# Patient Record
Sex: FEMALE | Race: WHITE | NOT HISPANIC OR LATINO | Employment: UNEMPLOYED | URBAN - NONMETROPOLITAN AREA
[De-identification: names, ages, dates, MRNs, and addresses within clinical notes are randomized per-mention and may not be internally consistent; named-entity substitution may affect disease eponyms.]

---

## 2018-09-07 ENCOUNTER — PROCEDURE VISIT (OUTPATIENT)
Dept: OBSTETRICS AND GYNECOLOGY | Facility: CLINIC | Age: 23
End: 2018-09-07

## 2018-09-07 VITALS
WEIGHT: 108 LBS | HEIGHT: 62 IN | SYSTOLIC BLOOD PRESSURE: 110 MMHG | DIASTOLIC BLOOD PRESSURE: 62 MMHG | BODY MASS INDEX: 19.88 KG/M2

## 2018-09-07 DIAGNOSIS — Z12.4 SCREENING FOR MALIGNANT NEOPLASM OF CERVIX: ICD-10-CM

## 2018-09-07 DIAGNOSIS — Z01.419 ENCOUNTER FOR WELL WOMAN EXAM WITH ROUTINE GYNECOLOGICAL EXAM: Primary | ICD-10-CM

## 2018-09-07 PROCEDURE — 99385 PREV VISIT NEW AGE 18-39: CPT | Performed by: OBSTETRICS & GYNECOLOGY

## 2018-09-07 NOTE — PROGRESS NOTES
Subjective  Chief Complaint   Patient presents with   • Gynecologic Exam     NO COMPLAINTS. NO PREVIOUS PAP SMEARS, LMP: 8/15/18. REQUESTING REFILL ON SPRINTEC     Usha Delong is a 23 y.o. year old  presenting to be seen for her annual exam.     OTHER COMPLAINTS:  Nothing else    She denies nausea, emesis, fevers, chills, mastalgia, myalgia, dyspnea, chest pain, headaches, vision changes.    MENSTRUAL Hx:  LMP 8/15/18  In the past 6 months her cycles have been regular, predictable and occur monthly.   Her menstrual flow is typically normal and flow is normal.   Each month on average there are roughly 5 days of very heavy flow.    Intermenstrual bleeding is absent.    Post-coital bleeding is absent.  Dysmenorrhea: none and is not affecting her activities of daily living  PMS: none and is not affecting her activities of daily living  Her cycles are not a source of concern for her that she wishes to discuss today.   Current birth control method: OCP (estrogen/progesterone).    Routine Health Maintenance  Last Pap Smear: Never  Last MMG:  Never  Last Dexa:  Never  Last Colonoscopy: Never    History  Past Medical History:   Diagnosis Date   • Scoliosis      Current Outpatient Prescriptions on File Prior to Visit   Medication Sig Dispense Refill   • [DISCONTINUED] norgestimate-ethinyl estradiol (SPRINTEC 28) 0.25-35 MG-MCG per tablet Take 1 tablet by mouth Daily.     • cyclobenzaprine (FLEXERIL) 10 MG tablet Take 1 tablet by mouth 3 (Three) Times a Day As Needed for Muscle Spasms (no driving or operating equipment while taking). 20 tablet 0   • diclofenac (VOLTAREN) 50 MG EC tablet Take 1 tablet by mouth 3 (Three) Times a Day. Take with food 60 tablet 0   • MethylPREDNISolone (MEDROL, AUNG,) 4 MG tablet Take as directed on package instructions. 21 tablet 0     No current facility-administered medications on file prior to visit.      No Known Allergies  History reviewed. No pertinent surgical  "history.  History reviewed. No pertinent family history.  Social History     Social History   • Marital status: Single     Social History Main Topics   • Smoking status: Former Smoker   • Drug use: Unknown     Other Topics Concern   • Not on file       Review of Systems  Pertinent items are noted in HPI, all other systems reviewed and negative    Objective  /62   Ht 157.5 cm (62\")   Wt 49 kg (108 lb)   BMI 19.75 kg/m²   Physical Exam:  General Appearance: alert, pleasant, appears stated age, interactive and cooperative  Head: normocephalic, without obvious abnormality and atraumatic  Eyes: lids and lashes normal and no icterus  Ears: ears appear intact with no abnormalities noted  Nose: nares normal, septum midline, mucosa normal and no drainage  Neck: suppple, trachea midline and no thyromegaly  Back: no kyphosis present, no scoliosis present and range of motion normal  Lungs: respirations regular, respirations even and respirations unlabored  Abdomen: no masses, no hepatomegaly, no splenomegaly, soft non-tender, no guarding and no rebound tenderness  Extremities: moves extremities well, no edema, no cyanosis and no redness  Skin: no bleeding, bruising or rash and no lesions noted  Lymph Nodes: no palpable adenopathy  Neurologic: Cranial Nerves cranial nerves 2 - 12 grossly intact, Speech normal content and execusion, Coordination normal  Psych: normal mood and affect, oriented to person, time and place, thought content organized and appropriate judgment    Pelvis:  Pelvic: Clinical staff was present for exam  External genitalia:  normal appearance of the external genitalia including Bartholin's and [place]'s glands.  :  urethral meatus normal;  Vagina:  normal pink mucosa without prolapse or lesions.  Cervix:  normal appearance.  Uterus:  normal size, shape and consistency.  Adnexa:  normal bimanual exam of the adnexa.  Rectal:  digital rectal exam not performed; anus visually normal " appearing.    Assessment/Plan     Problem List Items Addressed This Visit     None      Visit Diagnoses     Screening for malignant neoplasm of cervix    -  Primary    Relevant Orders    Pap IG, Rfx HPV ASCU        Well woman exam:  - Self breast awareness encouraged  - Mammogram: N/A  - Pap screening guidelines reviewed; pap smear collected today  - Yearly clinical breast and pelvic exams recommended regardless of pap recommendations  - Dexa N/A  - Colonoscopy N/A  - Healthy diet and exercise encouraged  - Calcium and Vitamin D requirements reviewed  - Contraception: OCPs  - Screening: None  - Seat belt use encouraged    Follow up 1 year for annual exam    Brien Gonzalez MD  Obstetrics and Gynecology  [facility]

## 2018-09-10 ENCOUNTER — TELEPHONE (OUTPATIENT)
Dept: OBSTETRICS AND GYNECOLOGY | Facility: CLINIC | Age: 23
End: 2018-09-10

## 2018-09-10 DIAGNOSIS — Z30.41 ORAL CONTRACEPTIVE PILL SURVEILLANCE: Primary | ICD-10-CM

## 2018-09-10 DIAGNOSIS — Z30.41 ORAL CONTRACEPTIVE PILL SURVEILLANCE: ICD-10-CM

## 2018-09-10 RX ORDER — NORGESTIMATE AND ETHINYL ESTRADIOL 0.25-0.035
1 KIT ORAL DAILY
Qty: 90 TABLET | Refills: 5 | Status: SHIPPED | OUTPATIENT
Start: 2018-09-10 | End: 2018-09-10 | Stop reason: SDUPTHER

## 2018-09-10 RX ORDER — NORGESTIMATE AND ETHINYL ESTRADIOL 0.25-0.035
1 KIT ORAL DAILY
Qty: 84 TABLET | Refills: 5 | Status: SHIPPED | OUTPATIENT
Start: 2018-09-10 | End: 2019-02-08 | Stop reason: SDUPTHER

## 2018-09-10 NOTE — TELEPHONE ENCOUNTER
----- Message from Lily Mcdonald sent at 9/7/2018  4:02 PM EDT -----  Contact: betina  Patient seen Dr. Gonzalez today. She is requesting a prescription for birth control. She stated she did not talk to Dr. Gonzalez about birth control during her visit today.  She is currently on sprintec 0.025mg/0.035mg.  She would like this sent to  pharmacy if it is approved.   Thank you.

## 2018-09-18 DIAGNOSIS — Z12.4 SCREENING FOR MALIGNANT NEOPLASM OF CERVIX: ICD-10-CM

## 2019-02-08 ENCOUNTER — TELEPHONE (OUTPATIENT)
Dept: OBSTETRICS AND GYNECOLOGY | Facility: CLINIC | Age: 24
End: 2019-02-08

## 2019-02-08 DIAGNOSIS — Z30.41 ORAL CONTRACEPTIVE PILL SURVEILLANCE: ICD-10-CM

## 2019-02-08 RX ORDER — NORGESTIMATE AND ETHINYL ESTRADIOL 0.25-0.035
1 KIT ORAL DAILY
Qty: 84 TABLET | Refills: 0 | Status: SHIPPED | OUTPATIENT
Start: 2019-02-08 | End: 2019-05-12 | Stop reason: SDUPTHER

## 2019-02-08 NOTE — TELEPHONE ENCOUNTER
----- Message from Sarah Iverson sent at 2/8/2019  3:04 PM EST -----  Contact: Pt  Pt needs a refill for her birth control. She asked for a 1 mth supply to go to [facility].

## 2019-05-12 DIAGNOSIS — Z30.41 ORAL CONTRACEPTIVE PILL SURVEILLANCE: ICD-10-CM

## 2019-10-12 DIAGNOSIS — Z30.41 ORAL CONTRACEPTIVE PILL SURVEILLANCE: ICD-10-CM

## 2019-11-17 DIAGNOSIS — Z30.41 ORAL CONTRACEPTIVE PILL SURVEILLANCE: ICD-10-CM

## 2019-12-20 DIAGNOSIS — Z30.41 ORAL CONTRACEPTIVE PILL SURVEILLANCE: ICD-10-CM

## 2019-12-20 RX ORDER — NORGESTIMATE AND ETHINYL ESTRADIOL 0.25-0.035
1 KIT ORAL DAILY
Qty: 28 TABLET | Refills: 0 | Status: SHIPPED | OUTPATIENT
Start: 2019-12-20 | End: 2020-01-02 | Stop reason: SDUPTHER

## 2019-12-20 NOTE — TELEPHONE ENCOUNTER
----- Message from Minerva Nelson sent at 12/20/2019  9:58 AM EST -----  Contact: PT  PT IS SCHEDULED WITH DR MCCARTHY FOR ANNUAL ON 1/2/20.  PLEASE SEND REFILL OF SPRINTEC TO [facility] IN [place].  THANKS

## 2020-01-02 ENCOUNTER — OFFICE VISIT (OUTPATIENT)
Dept: OBSTETRICS AND GYNECOLOGY | Facility: CLINIC | Age: 25
End: 2020-01-02

## 2020-01-02 VITALS
DIASTOLIC BLOOD PRESSURE: 60 MMHG | BODY MASS INDEX: 22.09 KG/M2 | WEIGHT: 117 LBS | HEIGHT: 61 IN | SYSTOLIC BLOOD PRESSURE: 102 MMHG

## 2020-01-02 DIAGNOSIS — Z30.41 ORAL CONTRACEPTIVE PILL SURVEILLANCE: ICD-10-CM

## 2020-01-02 PROCEDURE — 99213 OFFICE O/P EST LOW 20 MIN: CPT | Performed by: OBSTETRICS & GYNECOLOGY

## 2020-01-02 RX ORDER — NORGESTIMATE AND ETHINYL ESTRADIOL 0.25-0.035
1 KIT ORAL DAILY
Qty: 90 TABLET | Refills: 5 | Status: SHIPPED | OUTPATIENT
Start: 2020-01-02 | End: 2021-01-08 | Stop reason: SDUPTHER

## 2020-01-02 NOTE — PROGRESS NOTES
Chief Complaint   Patient presents with   • Gynecologic Exam     Last pap smear 18 WNL, needs refills on Sprintec. No complaints.       Usha Saleem is a 24 y.o. year old  presenting to be seen for OCP surveillance.    She has no complaints.  She is content with OCPs for birth control.  She does well remembering the pills.  They do improve her pain symptoms.  Her bleeding is light and not overly bothersome to her.  She needs a refill today.    Exam:  None    Assessment/Plan:  Oral contraceptive pill maintenance    Refill OCPs today.  RTC for annual visits.    Greater than 50% of this 15 minute visit was spent in face-to-face counseling and/or coordination of care for this patient.    Brien Gonzalez MD  Obstetrics and Gynecology  [facility]

## 2020-11-16 PROCEDURE — U0004 COV-19 TEST NON-CDC HGH THRU: HCPCS | Performed by: NURSE PRACTITIONER

## 2021-01-08 ENCOUNTER — OFFICE VISIT (OUTPATIENT)
Dept: OBSTETRICS AND GYNECOLOGY | Facility: CLINIC | Age: 26
End: 2021-01-08

## 2021-01-08 VITALS
DIASTOLIC BLOOD PRESSURE: 64 MMHG | WEIGHT: 130 LBS | HEIGHT: 61 IN | BODY MASS INDEX: 24.55 KG/M2 | SYSTOLIC BLOOD PRESSURE: 102 MMHG

## 2021-01-08 DIAGNOSIS — Z30.41 ORAL CONTRACEPTIVE PILL SURVEILLANCE: ICD-10-CM

## 2021-01-08 DIAGNOSIS — Z01.419 WELL WOMAN EXAM WITH ROUTINE GYNECOLOGICAL EXAM: Primary | ICD-10-CM

## 2021-01-08 PROCEDURE — 99395 PREV VISIT EST AGE 18-39: CPT | Performed by: OBSTETRICS & GYNECOLOGY

## 2021-01-08 RX ORDER — NORGESTIMATE AND ETHINYL ESTRADIOL 0.25-0.035
1 KIT ORAL DAILY
Qty: 90 TABLET | Refills: 5 | Status: SHIPPED | OUTPATIENT
Start: 2021-01-08 | End: 2022-01-13 | Stop reason: SDUPTHER

## 2021-01-09 NOTE — PROGRESS NOTES
"Annual Well Woman Visit    Subjective   Chief Complaint   Patient presents with   • Gynecologic Exam     Last pap 18 WNL, no complaints     Usha Saleem is a 25 y.o. year old  presenting to be seen for an annual well woman visit.    No complaints.  Content with OCPs.    She denies sexual dysfunction. She denies urinary complaints including incontinence.    OB Hx: nulliparous  Contraception: OCPs  Pap smear: 2019  Mammogram: never  Colonoscopy: never  DEXA Scan: never    Past Medical History:   Diagnosis Date   • Scoliosis      No past surgical history on file.  History reviewed. No pertinent family history.  Social History     Tobacco Use   • Smoking status: Former Smoker   • Smokeless tobacco: Never Used   Substance Use Topics   • Alcohol use: Never     Frequency: Never   • Drug use: Never     (Not in a hospital admission)    Patient has no known allergies.  No current outpatient medications on file prior to visit.     No current facility-administered medications on file prior to visit.      Social History    Tobacco Use      Smoking status: Former Smoker      Smokeless tobacco: Never Used      Review of Systems  Pertinent items are noted in HPI, all other systems were reviewed and negative       Objective   /64   Ht 154.9 cm (61\")   Wt 59 kg (130 lb)   LMP 2021   BMI 24.56 kg/m²     Physical Exam:  General Appearance: alert, pleasant, appears stated age, interactive and cooperative  Breasts: Examined in supine position  Symmetric without masses or skin dimpling  Nipples normal without inversion, lesions or discharge  There are no palpable axillary nodes  Abdomen: no masses, no hepatomegaly, no splenomegaly, soft non-tender, no guarding and no rebound tenderness    Pelvis:  Pelvic: Clinical staff was present for exam  External genitalia:  normal appearance of the external genitalia including Bartholin's and [place]'s glands.  :  urethral meatus normal;  Vagina:  normal pink " mucosa without prolapse or lesions.  Cervix:  normal appearance.  Uterus:  normal size, shape and consistency.  Adnexa:  normal bimanual exam of the adnexa.  Rectal:  digital rectal exam not performed; anus visually normal appearing.       Assessment   Annual well woman exam with age appropriate screening  Oral contraceptive pill maintenance     Plan    No orders of the defined types were placed in this encounter.    Medications ordered: refill OCPs    Procedures performed: none    - Mammogram: Not indicated  - Pap screening guidelines reviewed; pap smear not indicated today  - Yearly clinical breast and pelvic exams recommended regardless of pap recommendations  - Dexa scan: not indicated   - Colonoscopy: not indicated  - Healthy diet and exercise encouraged  - Calcium and Vitamin D requirements reviewed  - Contraception: OCPs refilled  - Screening: none.  She declines screening for STIs today.    Follow up for annual visits    Brien Gonzalez MD  Obstetrics and Gynecology  [facility]

## 2022-01-13 ENCOUNTER — OFFICE VISIT (OUTPATIENT)
Dept: OBSTETRICS AND GYNECOLOGY | Facility: CLINIC | Age: 27
End: 2022-01-13

## 2022-01-13 VITALS
BODY MASS INDEX: 25.11 KG/M2 | WEIGHT: 133 LBS | HEIGHT: 61 IN | SYSTOLIC BLOOD PRESSURE: 122 MMHG | DIASTOLIC BLOOD PRESSURE: 72 MMHG

## 2022-01-13 DIAGNOSIS — Z12.4 SCREENING FOR MALIGNANT NEOPLASM OF CERVIX: ICD-10-CM

## 2022-01-13 DIAGNOSIS — Z01.419 WELL WOMAN EXAM WITH ROUTINE GYNECOLOGICAL EXAM: Primary | ICD-10-CM

## 2022-01-13 DIAGNOSIS — Z30.41 ORAL CONTRACEPTIVE PILL SURVEILLANCE: ICD-10-CM

## 2022-01-13 PROCEDURE — 99395 PREV VISIT EST AGE 18-39: CPT | Performed by: OBSTETRICS & GYNECOLOGY

## 2022-01-13 RX ORDER — NORGESTIMATE AND ETHINYL ESTRADIOL 0.25-0.035
1 KIT ORAL DAILY
Qty: 90 TABLET | Refills: 5 | Status: SHIPPED | OUTPATIENT
Start: 2022-01-13 | End: 2023-02-10 | Stop reason: SDUPTHER

## 2022-01-13 NOTE — PROGRESS NOTES
"Annual Well Woman Visit    Subjective   Chief Complaint   Patient presents with   • Gynecologic Exam     Last pap 18 WNL, no complaints.     Usha Saleem is a 26 y.o. year old  presenting to be seen for an annual well woman visit.    No complaints.  Content with OCPs.    She denies sexual dysfunction. She denies urinary complaints including incontinence.    OB Hx: nulliparous  Contraception: OCPs  Pap smear: 2018  Mammogram: never  Colonoscopy: never  DEXA Scan: never    Past Medical History:   Diagnosis Date   • Scoliosis      No past surgical history on file.  History reviewed. No pertinent family history.  Social History     Tobacco Use   • Smoking status: Former Smoker   • Smokeless tobacco: Never Used   Vaping Use   • Vaping Use: Never used   Substance Use Topics   • Alcohol use: Never   • Drug use: Never     (Not in a hospital admission)    Patient has no known allergies.  Current Outpatient Medications on File Prior to Visit   Medication Sig Dispense Refill   • [DISCONTINUED] norgestimate-ethinyl estradiol (Sprintec 28) 0.25-35 MG-MCG per tablet Take 1 tablet by mouth Daily. 90 tablet 5     No current facility-administered medications on file prior to visit.     Social History    Tobacco Use      Smoking status: Former Smoker      Smokeless tobacco: Never Used      Review of Systems  Pertinent items are noted in HPI, all other systems were reviewed and negative       Objective   /72   Ht 154.9 cm (61\")   Wt 60.3 kg (133 lb)   LMP 2021   BMI 25.13 kg/m²     Physical Exam:  General Appearance: alert, pleasant, appears stated age, interactive and cooperative  Breasts: Not performed.  Abdomen: no masses, no hepatomegaly, no splenomegaly, soft non-tender, no guarding and no rebound tenderness    Pelvis:  Pelvic: Clinical staff was present for exam  External genitalia:  normal appearance of the external genitalia including Bartholin's and [place]'s glands.  :  urethral meatus " normal;  Vagina:  normal pink mucosa without prolapse or lesions.  Cervix:  normal appearance.  Uterus:  normal size, shape and consistency.  Adnexa:  normal bimanual exam of the adnexa.  Rectal:  digital rectal exam not performed; anus visually normal appearing.       Assessment   Annual well woman exam with age appropriate screening  Oral contraceptive pill maintenance     Plan    No orders of the defined types were placed in this encounter.    Medications ordered: refill OCPs    Procedures performed: pap smear    - Mammogram: Not indicated  - Pap screening guidelines reviewed; pap smear collected today  - Yearly clinical breast and pelvic exams recommended regardless of pap recommendations  - Dexa scan: not indicated   - Colonoscopy: not indicated  - Healthy diet and exercise encouraged  - Calcium and Vitamin D requirements reviewed  - Contraception: OCPs refilled  - Screening: none.  She declines screening for STIs today.    Follow up for annual visits    Brien Gonzalez MD  Obstetrics and Gynecology  [facility]

## 2022-01-14 DIAGNOSIS — Z30.41 ORAL CONTRACEPTIVE PILL SURVEILLANCE: ICD-10-CM

## 2022-01-14 RX ORDER — NORGESTIMATE AND ETHINYL ESTRADIOL 0.25-0.035
KIT ORAL
Qty: 84 TABLET | Refills: 5 | OUTPATIENT
Start: 2022-01-14

## 2022-01-26 DIAGNOSIS — Z12.4 SCREENING FOR MALIGNANT NEOPLASM OF CERVIX: ICD-10-CM

## 2022-01-28 DIAGNOSIS — B37.31 VULVOVAGINAL CANDIDIASIS: Primary | ICD-10-CM

## 2022-01-28 RX ORDER — FLUCONAZOLE 150 MG/1
150 TABLET ORAL DAILY
Qty: 1 TABLET | Refills: 0 | OUTPATIENT
Start: 2022-01-28 | End: 2022-08-04

## 2022-01-30 PROCEDURE — U0004 COV-19 TEST NON-CDC HGH THRU: HCPCS | Performed by: NURSE PRACTITIONER

## 2022-02-06 PROCEDURE — U0004 COV-19 TEST NON-CDC HGH THRU: HCPCS | Performed by: EMERGENCY MEDICINE

## 2022-08-04 PROCEDURE — U0004 COV-19 TEST NON-CDC HGH THRU: HCPCS | Performed by: NURSE PRACTITIONER

## 2023-02-10 ENCOUNTER — OFFICE VISIT (OUTPATIENT)
Dept: OBSTETRICS AND GYNECOLOGY | Facility: CLINIC | Age: 28
End: 2023-02-10
Payer: COMMERCIAL

## 2023-02-10 VITALS
SYSTOLIC BLOOD PRESSURE: 118 MMHG | BODY MASS INDEX: 25.68 KG/M2 | DIASTOLIC BLOOD PRESSURE: 78 MMHG | HEIGHT: 61 IN | WEIGHT: 136 LBS

## 2023-02-10 DIAGNOSIS — Z30.41 ORAL CONTRACEPTIVE PILL SURVEILLANCE: ICD-10-CM

## 2023-02-10 DIAGNOSIS — L91.8 SKIN TAG: ICD-10-CM

## 2023-02-10 DIAGNOSIS — Z01.419 WELL WOMAN EXAM WITH ROUTINE GYNECOLOGICAL EXAM: Primary | ICD-10-CM

## 2023-02-10 PROCEDURE — 99395 PREV VISIT EST AGE 18-39: CPT | Performed by: OBSTETRICS & GYNECOLOGY

## 2023-02-10 PROCEDURE — 11200 RMVL SKIN TAGS UP TO&INC 15: CPT | Performed by: OBSTETRICS & GYNECOLOGY

## 2023-02-10 RX ORDER — NORGESTIMATE AND ETHINYL ESTRADIOL 0.25-0.035
1 KIT ORAL DAILY
Qty: 90 TABLET | Refills: 5 | Status: SHIPPED | OUTPATIENT
Start: 2023-02-10 | End: 2023-03-07

## 2023-02-14 LAB — REF LAB TEST METHOD: NORMAL

## 2023-02-15 NOTE — PROGRESS NOTES
"Annual Well Woman Visit    Subjective   Chief Complaint   Patient presents with   • Gynecologic Exam     Last pap 22 WNL, skin tag on right breast that keeps growing.     Usha Saleem is a 28 y.o. year old  presenting to be seen for an annual well woman visit.    No major complaints.  Content with OCPs - needs refills.  She would like to have a skin tag on her right nipple removed.    She denies sexual dysfunction. She denies urinary complaints including incontinence.    OB Hx: nulliparous  Contraception: OCPs  Pap smear:  - NILM  Mammogram: never  Colonoscopy: never  DEXA Scan: never    Past Medical History:   Diagnosis Date   • Kidney stone 2015   • Scoliosis    • Urinary tract infection      No past surgical history on file.  No family history on file.  Social History     Tobacco Use   • Smoking status: Former   • Smokeless tobacco: Never   Vaping Use   • Vaping Use: Never used   Substance Use Topics   • Alcohol use: Never   • Drug use: Never     (Not in a hospital admission)    Patient has no known allergies.  No current outpatient medications on file prior to visit.     No current facility-administered medications on file prior to visit.     Social History    Tobacco Use      Smoking status: Former      Smokeless tobacco: Never      Review of Systems  Pertinent items are noted in HPI, all other systems were reviewed and negative       Objective   /78   Ht 154.9 cm (61\")   Wt 61.7 kg (136 lb)   BMI 25.70 kg/m²     Physical Exam:  General Appearance: alert, pleasant, appears stated age, interactive and cooperative  Breasts: Examined in supine position  Symmetric without masses or skin dimpling  There are no palpable axillary nodes  Nipples are normal except for a small skin tag on the right breast.  Abdomen: no masses, no hepatomegaly, no splenomegaly, soft non-tender, no guarding and no rebound tenderness    Pelvis:  Pelvic: Clinical staff was present for exam  External " genitalia:  normal appearance of the external genitalia including Bartholin's and [place]'s glands.  :  urethral meatus normal;  Vagina:  normal pink mucosa without prolapse or lesions.  Cervix:  normal appearance.  Uterus:  normal size, shape and consistency.  Adnexa:  normal bimanual exam of the adnexa.  Rectal:  digital rectal exam not performed; anus visually normal appearing.       Assessment   Annual well woman exam with age appropriate screening  Oral contraceptive pill maintenance  Skin tag removal     Plan    No orders of the defined types were placed in this encounter.    Medications ordered: refill OCPs    Procedures performed: Skin tag removal    - Mammogram: Not indicated  - Pap screening guidelines reviewed; pap smear not indicated today  - Yearly clinical breast and pelvic exams recommended regardless of pap recommendations  - Dexa scan: not indicated   - Colonoscopy: not indicated  - Healthy diet and exercise encouraged  - Calcium and Vitamin D requirements reviewed  - Contraception: OCPs refilled  - Screening: none.  She declines screening for STIs today.    Excision of skin tag    Date of procedure:  02/10/2023    Risks and benefits discussed? yes  All questions answered? yes  Consents given by The patient  Written consent obtained? yes    Local anesthesia used:  yes - 1 cc's of  Meds; anesthesia local: None, 1% lidocaine with epinephrine    Procedure:    The patient was brought to the procedure room. Consent was obtained. The area was prepped and draped in the usual fashion. 1% lidocaine with epinephrine was infused locally.  The skin tag was elevated with pickups and excised at its base with iris scissors.  Silver nitrate was applied for hemostasis.  There were no complications and the patient tolerated the procedure well.    Post procedure Instructions:  The patient was informed to call the office if bleeding, pain, swelling, redness around the site, drainage, or fever and chills.  Patient to go  to the ER if after hours or call the office.  All questions answered.  Patient voices understanding. The patient is to contact the office for pathology results in 1 week if she has not heard from us.    Follow up for annual visits    Brien Gonzalez MD  Obstetrics and Gynecology  [facility]

## 2023-03-06 DIAGNOSIS — Z30.41 ORAL CONTRACEPTIVE PILL SURVEILLANCE: ICD-10-CM

## 2023-03-07 RX ORDER — NORGESTIMATE AND ETHINYL ESTRADIOL 0.25-0.035
KIT ORAL
Qty: 84 TABLET | Refills: 5 | Status: SHIPPED | OUTPATIENT
Start: 2023-03-07

## 2024-04-24 DIAGNOSIS — Z30.41 ORAL CONTRACEPTIVE PILL SURVEILLANCE: ICD-10-CM

## 2024-04-24 RX ORDER — NORGESTIMATE AND ETHINYL ESTRADIOL 0.25-0.035
KIT ORAL
Qty: 84 TABLET | Refills: 5 | Status: SHIPPED | OUTPATIENT
Start: 2024-04-24

## 2024-08-23 ENCOUNTER — OFFICE VISIT (OUTPATIENT)
Dept: OBSTETRICS AND GYNECOLOGY | Facility: CLINIC | Age: 29
End: 2024-08-23
Payer: COMMERCIAL

## 2024-08-23 VITALS
WEIGHT: 132 LBS | SYSTOLIC BLOOD PRESSURE: 104 MMHG | DIASTOLIC BLOOD PRESSURE: 64 MMHG | BODY MASS INDEX: 24.29 KG/M2 | HEIGHT: 62 IN

## 2024-08-23 DIAGNOSIS — Z12.4 SCREENING FOR MALIGNANT NEOPLASM OF CERVIX: ICD-10-CM

## 2024-08-23 DIAGNOSIS — Z01.419 WELL WOMAN EXAM WITH ROUTINE GYNECOLOGICAL EXAM: Primary | ICD-10-CM

## 2024-08-23 DIAGNOSIS — Z30.41 ORAL CONTRACEPTIVE PILL SURVEILLANCE: ICD-10-CM

## 2024-08-23 RX ORDER — NORGESTIMATE AND ETHINYL ESTRADIOL 0.25-0.035
1 KIT ORAL DAILY
Qty: 84 TABLET | Refills: 5 | Status: SHIPPED | OUTPATIENT
Start: 2024-08-23

## 2024-08-30 LAB — REF LAB TEST METHOD: NORMAL

## 2024-09-04 NOTE — PROGRESS NOTES
"Annual Well Woman Visit    Subjective   Chief Complaint   Patient presents with    Gynecologic Exam     Last pap 22 WNL, no complaints     Usha Saleem is a 29 y.o. year old  presenting to be seen for an annual well woman visit.    No major complaints.  Content with OCPs - needs refills.      She denies sexual dysfunction. She denies urinary complaints including incontinence.    OB Hx: nulliparous  Contraception: OCPs  Pap smear:  - NILM  Mammogram: never  Colonoscopy: never  DEXA Scan: never    Past Medical History:   Diagnosis Date    Kidney stone 2015    Scoliosis     Urinary tract infection 2016     No past surgical history on file.  No family history on file.  Social History     Tobacco Use    Smoking status: Former    Smokeless tobacco: Never   Vaping Use    Vaping status: Never Used   Substance Use Topics    Alcohol use: Never    Drug use: Never     (Not in a hospital admission)    Patient has no known allergies.  No current outpatient medications on file prior to visit.     No current facility-administered medications on file prior to visit.     Social History    Tobacco Use      Smoking status: Former      Smokeless tobacco: Never      Review of Systems  Pertinent items are noted in HPI, all other systems were reviewed and negative       Objective   /64   Ht 157.5 cm (62\")   Wt 59.9 kg (132 lb)   BMI 24.14 kg/m²     Physical Exam:  General Appearance: alert, pleasant, appears stated age, interactive and cooperative  Breasts: Examined in supine position  Symmetric without masses or skin dimpling  There are no palpable axillary nodes  Nipples are normal except for a small skin tag on the right breast.  Abdomen: no masses, no hepatomegaly, no splenomegaly, soft non-tender, no guarding and no rebound tenderness    Pelvis:  Pelvic: Clinical staff was present for exam  External genitalia:  normal appearance of the external genitalia including Bartholin's and [place]'s " glands.  :  urethral meatus normal;  Vagina:  normal pink mucosa without prolapse or lesions.  Cervix:  normal appearance.  Uterus:  normal size, shape and consistency.  Adnexa:  normal bimanual exam of the adnexa.  Rectal:  digital rectal exam not performed; anus visually normal appearing.       Assessment   Annual well woman exam with age appropriate screening  Oral contraceptive pill maintenance     Plan    No orders of the defined types were placed in this encounter.    Medications ordered: refill OCPs    Procedures performed: pap smear    - Mammogram: Not indicated  - Pap screening guidelines reviewed; pap smear collected today  - Yearly clinical breast and pelvic exams recommended regardless of pap recommendations  - Dexa scan: not indicated   - Colonoscopy: not indicated  - Healthy diet and exercise encouraged  - Contraception: OCPs refilled  - Screening: none.      Follow up for annual visits    Brien Gonzalez MD  Obstetrics and Gynecology  [facility]